# Patient Record
Sex: FEMALE | Race: OTHER
[De-identification: names, ages, dates, MRNs, and addresses within clinical notes are randomized per-mention and may not be internally consistent; named-entity substitution may affect disease eponyms.]

---

## 2018-05-01 ENCOUNTER — TRANSCRIPTION ENCOUNTER (OUTPATIENT)
Age: 23
End: 2018-05-01

## 2018-09-27 ENCOUNTER — TRANSCRIPTION ENCOUNTER (OUTPATIENT)
Age: 23
End: 2018-09-27

## 2019-06-26 ENCOUNTER — TRANSCRIPTION ENCOUNTER (OUTPATIENT)
Age: 24
End: 2019-06-26

## 2020-08-30 ENCOUNTER — TRANSCRIPTION ENCOUNTER (OUTPATIENT)
Age: 25
End: 2020-08-30

## 2020-08-31 ENCOUNTER — TRANSCRIPTION ENCOUNTER (OUTPATIENT)
Age: 25
End: 2020-08-31

## 2020-09-03 ENCOUNTER — TRANSCRIPTION ENCOUNTER (OUTPATIENT)
Age: 25
End: 2020-09-03

## 2020-11-28 ENCOUNTER — TRANSCRIPTION ENCOUNTER (OUTPATIENT)
Age: 25
End: 2020-11-28

## 2020-12-29 ENCOUNTER — TRANSCRIPTION ENCOUNTER (OUTPATIENT)
Age: 25
End: 2020-12-29

## 2021-02-27 ENCOUNTER — TRANSCRIPTION ENCOUNTER (OUTPATIENT)
Age: 26
End: 2021-02-27

## 2021-04-03 ENCOUNTER — TRANSCRIPTION ENCOUNTER (OUTPATIENT)
Age: 26
End: 2021-04-03

## 2021-04-22 ENCOUNTER — TRANSCRIPTION ENCOUNTER (OUTPATIENT)
Age: 26
End: 2021-04-22

## 2021-06-25 ENCOUNTER — TRANSCRIPTION ENCOUNTER (OUTPATIENT)
Age: 26
End: 2021-06-25

## 2021-06-27 ENCOUNTER — TRANSCRIPTION ENCOUNTER (OUTPATIENT)
Age: 26
End: 2021-06-27

## 2021-07-01 ENCOUNTER — TRANSCRIPTION ENCOUNTER (OUTPATIENT)
Age: 26
End: 2021-07-01

## 2021-08-07 ENCOUNTER — TRANSCRIPTION ENCOUNTER (OUTPATIENT)
Age: 26
End: 2021-08-07

## 2021-08-21 ENCOUNTER — TRANSCRIPTION ENCOUNTER (OUTPATIENT)
Age: 26
End: 2021-08-21

## 2021-09-03 ENCOUNTER — TRANSCRIPTION ENCOUNTER (OUTPATIENT)
Age: 26
End: 2021-09-03

## 2021-12-01 ENCOUNTER — TRANSCRIPTION ENCOUNTER (OUTPATIENT)
Age: 26
End: 2021-12-01

## 2022-03-02 ENCOUNTER — TRANSCRIPTION ENCOUNTER (OUTPATIENT)
Age: 27
End: 2022-03-02

## 2022-03-06 ENCOUNTER — TRANSCRIPTION ENCOUNTER (OUTPATIENT)
Age: 27
End: 2022-03-06

## 2022-04-05 ENCOUNTER — TRANSCRIPTION ENCOUNTER (OUTPATIENT)
Age: 27
End: 2022-04-05

## 2022-07-19 ENCOUNTER — APPOINTMENT (OUTPATIENT)
Dept: ORTHOPEDIC SURGERY | Facility: CLINIC | Age: 27
End: 2022-07-19

## 2022-07-19 DIAGNOSIS — M54.12 RADICULOPATHY, CERVICAL REGION: ICD-10-CM

## 2022-07-19 DIAGNOSIS — S16.1XXD STRAIN OF MUSCLE, FASCIA AND TENDON AT NECK LEVEL, SUBSEQUENT ENCOUNTER: ICD-10-CM

## 2022-07-19 DIAGNOSIS — M77.8 OTHER ENTHESOPATHIES, NOT ELSEWHERE CLASSIFIED: ICD-10-CM

## 2022-07-19 PROBLEM — Z00.00 ENCOUNTER FOR PREVENTIVE HEALTH EXAMINATION: Status: ACTIVE | Noted: 2022-07-19

## 2022-07-19 PROCEDURE — 99072 ADDL SUPL MATRL&STAF TM PHE: CPT

## 2022-07-19 PROCEDURE — 99213 OFFICE O/P EST LOW 20 MIN: CPT

## 2022-07-21 PROBLEM — S16.1XXD NECK STRAIN, SUBSEQUENT ENCOUNTER: Status: ACTIVE | Noted: 2022-07-21

## 2022-07-21 PROBLEM — M77.8 TENDINITIS OF RIGHT SHOULDER: Status: ACTIVE | Noted: 2022-07-21

## 2022-07-21 PROBLEM — M54.12 CERVICAL RADICULOPATHY, ACUTE: Status: ACTIVE | Noted: 2022-07-21

## 2022-07-21 NOTE — REASON FOR VISIT
[FreeTextEntry2] :  11/15/2020 Radiculopathy, cervical region, Right shoulder tendinitis, Strain of muscle, fascia and tendon at neck level

## 2022-07-21 NOTE — HISTORY OF PRESENT ILLNESS
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 fierro gave her some injections offered an RFA if necessary\par Patient Complaint - Did return to work light duty did get her EMGs 2021 seeing a pain management doctor\par kash had a cervical epidural  therapy restarted at [facility] she was feel little better after the injection but\par the spasms came back again in the back of both shoulders and upper arms scheduled to have 2nd epidural \par reached the other day and seems to have aggravated some of the pain in the right shoulder\par is working restricted\par did have MRI right shoulder 2021\par still describing some tingling in the right arm did have 2nd epidural  did not see much difference reports some\par swelling in the side of the neck occasionally taking Motrin and restarted therapy\par History of Present Illness\par 25-year-old female comes in today for follow-up evaluation of her neck, right shoulder and right hand and wrist pain.\par 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 Patient is right-hand\par dominant. She states that she does have a sensitive stomach with gastritis and IBS, although she has been taking\par some over-the-counter anti-inflammatories. the Flexeril is not helping started therapy 12 times with [facility] 1 waiting for\par some additional sessions does feel looser after the therapy more uncomfortable when she sleeps no problems on the left\par side she is right handed did get the MRI of the cervical spine 2020

## 2022-07-21 NOTE — IMAGING
[de-identified] :  mild limits in motion in the neck right shoulder full motion mild impingement nondescript dysesthesia need for arm left thumb good motion no locking

## 2022-07-21 NOTE — ASSESSMENT
[FreeTextEntry1] :  she is going to follow up with pain management they may consider the RFA procedure also wants to try chiropractor rather than physical therapy she has a moderate partial temporary disability and can work I will see her in a few months

## 2022-08-04 ENCOUNTER — NON-APPOINTMENT (OUTPATIENT)
Age: 27
End: 2022-08-04

## 2022-10-15 ENCOUNTER — APPOINTMENT (OUTPATIENT)
Dept: ORTHOPEDIC SURGERY | Facility: CLINIC | Age: 27
End: 2022-10-15

## 2022-12-05 ENCOUNTER — NON-APPOINTMENT (OUTPATIENT)
Age: 27
End: 2022-12-05

## 2023-07-03 ENCOUNTER — OUTPATIENT (OUTPATIENT)
Dept: OUTPATIENT SERVICES | Facility: HOSPITAL | Age: 28
LOS: 1 days | End: 2023-07-03
Payer: COMMERCIAL

## 2023-07-03 DIAGNOSIS — N64.4 MASTODYNIA: ICD-10-CM

## 2023-07-03 PROCEDURE — 76641 ULTRASOUND BREAST COMPLETE: CPT | Mod: 50

## 2023-07-03 PROCEDURE — 76641 ULTRASOUND BREAST COMPLETE: CPT | Mod: 26,50

## 2023-07-03 PROCEDURE — G0279: CPT

## 2023-07-03 PROCEDURE — G0279: CPT | Mod: 26

## 2023-07-03 PROCEDURE — 77066 DX MAMMO INCL CAD BI: CPT

## 2023-07-03 PROCEDURE — 77066 DX MAMMO INCL CAD BI: CPT | Mod: 26

## 2023-07-04 DIAGNOSIS — N64.4 MASTODYNIA: ICD-10-CM
